# Patient Record
Sex: FEMALE | Race: WHITE
[De-identification: names, ages, dates, MRNs, and addresses within clinical notes are randomized per-mention and may not be internally consistent; named-entity substitution may affect disease eponyms.]

---

## 2018-10-13 ENCOUNTER — HOSPITAL ENCOUNTER (EMERGENCY)
Age: 33
Discharge: HOME | End: 2018-10-13

## 2019-01-07 ENCOUNTER — HOSPITAL ENCOUNTER (OUTPATIENT)
Dept: HOSPITAL 91 - OBT | Age: 34
Discharge: HOME | End: 2019-01-07
Payer: COMMERCIAL

## 2019-01-07 ENCOUNTER — HOSPITAL ENCOUNTER (OUTPATIENT)
Dept: HOSPITAL 10 - OBT | Age: 34
Discharge: HOME | End: 2019-01-07
Attending: OBSTETRICS & GYNECOLOGY
Payer: COMMERCIAL

## 2019-01-07 VITALS
WEIGHT: 182.32 LBS | HEIGHT: 62 IN | HEIGHT: 62 IN | BODY MASS INDEX: 33.55 KG/M2 | WEIGHT: 182.32 LBS | BODY MASS INDEX: 33.55 KG/M2

## 2019-01-07 VITALS — DIASTOLIC BLOOD PRESSURE: 69 MMHG | SYSTOLIC BLOOD PRESSURE: 114 MMHG | HEART RATE: 79 BPM | RESPIRATION RATE: 17 BRPM

## 2019-01-07 DIAGNOSIS — Z3A.20: ICD-10-CM

## 2019-01-07 DIAGNOSIS — R10.2: ICD-10-CM

## 2019-01-07 DIAGNOSIS — O26.892: Primary | ICD-10-CM

## 2019-01-07 LAB
ADD MAN DIFF?: NO
ADD UMIC: YES
BASOPHIL #: 0 10^3/UL (ref 0–0.1)
BASOPHILS %: 0.4 % (ref 0–2)
EOSINOPHILS #: 0.4 10^3/UL (ref 0–0.5)
EOSINOPHILS %: 3.7 % (ref 0–7)
HEMATOCRIT: 34.7 % (ref 37–47)
HEMOGLOBIN: 11.9 G/DL (ref 12–16)
IMMATURE GRANS #M: 0.04 10^3/UL (ref 0–0.03)
IMMATURE GRANS % (M): 0.4 % (ref 0–0.43)
LYMPHOCYTES #: 1.8 10^3/UL (ref 0.8–2.9)
LYMPHOCYTES %: 19 % (ref 15–51)
MEAN CORPUSCULAR HEMOGLOBIN: 32 PG (ref 29–33)
MEAN CORPUSCULAR HGB CONC: 34.3 G/DL (ref 32–37)
MEAN CORPUSCULAR VOLUME: 93.3 FL (ref 82–101)
MEAN PLATELET VOLUME: 11.3 FL (ref 7.4–10.4)
MONOCYTE #: 0.6 10^3/UL (ref 0.3–0.9)
MONOCYTES %: 6.6 % (ref 0–11)
NEUTROPHIL #: 6.6 10^3/UL (ref 1.6–7.5)
NEUTROPHILS %: 69.9 % (ref 39–77)
NUCLEATED RED BLOOD CELLS #: 0 10^3/UL (ref 0–0)
NUCLEATED RED BLOOD CELLS%: 0 /100WBC (ref 0–0)
PLATELET COUNT: 255 10^3/UL (ref 140–415)
RED BLOOD COUNT: 3.72 10^6/UL (ref 4.2–5.4)
RED CELL DISTRIBUTION WIDTH: 12.4 % (ref 11.5–14.5)
UR ASCORBIC ACID: 40 MG/DL
UR BACTERIA: (no result) /HPF
UR BILIRUBIN (DIP): NEGATIVE MG/DL
UR BLOOD (DIP): NEGATIVE MG/DL
UR CLARITY: (no result)
UR COLOR: YELLOW
UR GLUCOSE (DIP): NEGATIVE MG/DL
UR KETONES (DIP): (no result) MG/DL
UR LEUKOCYTE ESTERASE (DIP): (no result) LEU/UL
UR MUCUS: (no result) /HPF
UR NITRITE (DIP): NEGATIVE MG/DL
UR PH (DIP): 5 (ref 5–9)
UR RBC: 0 /HPF (ref 0–5)
UR SPECIFIC GRAVITY (DIP): 1.03 (ref 1–1.03)
UR SQUAMOUS EPITHELIAL CELL: (no result) /HPF
UR TOTAL PROTEIN (DIP): NEGATIVE MG/DL
UR UROBILINOGEN (DIP): NEGATIVE MG/DL
UR WBC: 4 /HPF (ref 0–5)
WHITE BLOOD COUNT: 9.4 10^3/UL (ref 4.8–10.8)

## 2019-01-07 PROCEDURE — G0463 HOSPITAL OUTPT CLINIC VISIT: HCPCS

## 2019-01-07 PROCEDURE — 76817 TRANSVAGINAL US OBSTETRIC: CPT

## 2019-01-07 PROCEDURE — 85025 COMPLETE CBC W/AUTO DIFF WBC: CPT

## 2019-01-07 PROCEDURE — 87086 URINE CULTURE/COLONY COUNT: CPT

## 2019-01-07 PROCEDURE — 81001 URINALYSIS AUTO W/SCOPE: CPT

## 2019-01-07 NOTE — TRIAGE
===================================

OB Triage

===================================

Datetime Report Generated by CPN: 01/07/2019 23:08

   

   

===========================

Datetime: 01/07/2019 20:30

===========================

   

 Stage of Pregnancy:  OB Triage

   

===========================

Datetime: 01/07/2019 19:36

===========================

   

 Stage of Pregnancy:  OB Triage

 Monitor Mode:  External

 Duration (sec)2399:  10sec

 Quality:  Mild

 Pattern:  Normal: <= 5 Contractions in 10 Minutes

 Resting Tone [place]:  Relaxed

   

===================================

Pain Assessment

===================================

   

 Pain Scale:  4

 Pain Presence:  Constant

 Pain Type:  Stabbing

 Pain Location:  Abdomen

   

===========================

Datetime: 01/07/2019 18:52

===========================

   

   

===================================

Labor Evaluation

===================================

   

 Frequency:  x3

 Monitor Mode:  External

 Duration (sec)2399:  30

 Quality:  Mild

 Pattern:  Normal: <= 5 Contractions in 10 Minutes

 Resting Tone [place]:  Relaxed

   

===========================

Datetime: 01/07/2019 17:52

===========================

   

   

===================================

Fetal Heart Rate

===================================

   

 Comments:  fhr 150 by us

   

===========================

Datetime: 01/07/2019 17:50

===========================

   

 Assessment Type:  Triage

   

===================================

Maternal Assessment

===================================

   

 Level of Consciousness:  Fully Conscious

 DTR's/Clonus:  DTRs 2+; No Clonus

 Headache:  Denies

 Blurred Vision:  No

 Respiratory Effort:  Unlabored; Regular Rhythm; Equal Expansion

 Breath Sounds, Left:  Clear and Equal

 Breath Sounds, Right:  Clear and Equal

 Nausea/Vomiting:  Denies

 RUQ Epigastric Pain:  Denies

 Lower Extremities Edema:  None

     Degree:  None

 Upper Extremities Edema:  None

     Degree:  None

 Facial Edema:  None

   

===================================

Fall Risk Assessment

===================================

   

 History of Falling:  (0) No

 Secondary Diagnosis:  (0) No

 Ambulatory Aid:  (0) Bedrest/Nurse Assist

 IV Therapy:  (0) No

 Gait:  (0) Normal/Bedrest/Immobile

 Mental Status:  (0) Oriented to Own Ability

 Fall Score:  0

 Fall Risk Score Definition:  No Risk: No action required

   

===========================

Datetime: 01/07/2019 17:49

===========================

   

 Time of Arrival:  01/07/2019 17:32

 EGA:  20.6

 Arrived By:  Ambulatory

 Arrived From:  Home

 Chief Complaint:  To ob triage c/o lower abdominal constant pain since 1100 am

 Fetal Movement:  Present

 Contractions:  Denies/Absent

 Rupture of Membranes:  Denies

 Vaginal Bleeding:  None

 Vaginal Discharge:  Denies

 Recent Sexual Intercouse:  Denies

 Abdominal Trauma:  Not Applicable

 Patient Complaints:  Other

 Time Provider Notified:  01/07/2019 18:00

 Provider Notified:  Dr Benson

 Initial Plan:  r/o ptl

## 2019-01-07 NOTE — PN
Triage Information


Date/Time


2019


Reason for visit:  Abd/pelvic pain


Weeks of Gestation


20w 6d


/Para


8/3


Diabetes:  none


Hypertention:  none


Additional information


Pt states she has had this pain before she delivered previously, but stronger. 


She says the pain started at 1100 today and went back and forth between 3/10 and


5/10 pain but was usually mild. Pt reports she has one spot of blood every 2 


days for weeks. She reports one this AM. Her last BM was last night. She last 


had intercourse 2 days ago. She states she was on various buses travelling all 


day today from 8AM to 5PM. She says she has a DCFS case pending and went to a 


parenting class today and had another one tonight but missed it in order to come


in. She says the pain is equal right = left in the lower abdomen. 


PMHx: none.


PSHx:  x 3.


NKDA.





Objective





Vital Signs


  Date      Temp  Pulse  Resp  B/P (MAP)   Pulse Ox  O2          O2 Flow    FiO2


Time                                                 Delivery    Rate


    19  98.4     79    17      114/69


     17:58                           (84)





Fetal Heart Rate:  150's


Contractions:  None


Exam


Pt appears very comfortable, is smiling while she talks. 


Abdomen completely soft and NT and there is no rebound. Uterus is soft to 


palpation. 


Deferred pelvic exam.





Results/Medications


Result Diagram:  


19 1836





Results 24 hrs





Laboratory Tests


     Test
                                      19
18:00  19
18:36


     Urine Color                          YELLOW


     Urine Clarity
                       SLIGHTLY
CLOUDY  A  



     Urine pH                                          5.0


     Urine Specific Gravity                          1.027


     Urine Ketones                                     2+  H


     Urine Nitrite                        NEGATIVE


     Urine Bilirubin                      NEGATIVE


     Urine Urobilinogen                   NEGATIVE


     Urine Leukocyte Esterase             TRACE  A


     Urine Microscopic RBC                               0


     Urine Microscopic WBC                               4


     Urine Squamous Epithelial
Cells      FEW  
              



     Urine Bacteria                       FEW  A


     Urine Mucus                          MANY  A


     Urine Hemoglobin                     NEGATIVE


     Urine Glucose                        NEGATIVE


     Urine Total Protein                  NEGATIVE


     White Blood Count                                               9.4


     Red Blood Count                                               3.72  L


     Hemoglobin                                                    11.9  L


     Hematocrit                                                    34.7  L


     Mean Corpuscular Volume                                        93.3


     Mean Corpuscular Hemoglobin                                    32.0


     Mean Corpuscular Hemoglobin
Concent  
                        34.3  



     Red Cell Distribution Width                                    12.4


     Platelet Count                                                  255


     Mean Platelet Volume                                          11.3  H


     Immature Granulocytes %                                       0.400


     Neutrophils %                                                  69.9


     Lymphocytes %                                                  19.0


     Monocytes %                                                     6.6


     Eosinophils %                                                   3.7


     Basophils %                                                     0.4


     Nucleated Red Blood Cells %                                     0.0


     Immature Granulocytes #                                      0.040  H


     Neutrophils #                                                   6.6


     Lymphocytes #                                                   1.8


     Monocytes #                                                     0.6


     Eosinophils #                                                   0.4


     Basophils #                                                     0.0


     Nucleated Red Blood Cells #                                     0.0





Imaging Results


US: CX long and closed and 4.7 cm long. Anterior placenta. No previa.


Disposition:  Discharge


Assessment/Plan


A: IUP at 20w 6d.


Abdominal pain of unclear etiology.


P: Pt feels very comfortable going home and is asking for something to eat prior


to going home and appears very comfortable.


Reviewed with pt that labs and US appear normal and there is no evidence of 


infection, or  labor.


May return if the pain increases and becomes constantly more severe.











MARCO PURI MD              2019 21:25

## 2019-04-08 ENCOUNTER — HOSPITAL ENCOUNTER (INPATIENT)
Dept: HOSPITAL 10 - OBT | Age: 34
LOS: 7 days | Discharge: HOME | End: 2019-04-15
Attending: OBSTETRICS & GYNECOLOGY | Admitting: OBSTETRICS & GYNECOLOGY
Payer: COMMERCIAL

## 2019-04-08 ENCOUNTER — HOSPITAL ENCOUNTER (INPATIENT)
Dept: HOSPITAL 91 - OBT | Age: 34
LOS: 7 days | Discharge: HOME | End: 2019-04-15
Payer: COMMERCIAL

## 2019-04-08 VITALS
WEIGHT: 182.76 LBS | HEIGHT: 60 IN | BODY MASS INDEX: 35.88 KG/M2 | BODY MASS INDEX: 35.88 KG/M2 | HEIGHT: 60 IN | WEIGHT: 182.76 LBS

## 2019-04-08 VITALS — DIASTOLIC BLOOD PRESSURE: 92 MMHG | RESPIRATION RATE: 17 BRPM | HEART RATE: 72 BPM | SYSTOLIC BLOOD PRESSURE: 143 MMHG

## 2019-04-08 DIAGNOSIS — O99.89: ICD-10-CM

## 2019-04-08 DIAGNOSIS — Z3A.35: ICD-10-CM

## 2019-04-08 DIAGNOSIS — O34.211: Primary | ICD-10-CM

## 2019-04-08 DIAGNOSIS — N73.6: ICD-10-CM

## 2019-04-08 LAB
ADD MAN DIFF?: NO
ADD UMIC: NO
ALANINE AMINOTRANSFERASE: 52 IU/L (ref 13–69)
ALBUMIN/GLOBULIN RATIO: 0.97
ALBUMIN: 3.8 G/DL (ref 3.3–4.9)
ALKALINE PHOSPHATASE: 208 IU/L (ref 42–121)
ANION GAP: 11 (ref 5–13)
ASPARTATE AMINO TRANSFERASE: 80 IU/L (ref 15–46)
BASOPHIL #: 0 10^3/UL (ref 0–0.1)
BASOPHILS %: 0.5 % (ref 0–2)
BILIRUBIN,DIRECT: 0 MG/DL (ref 0–0.2)
BILIRUBIN,TOTAL: 0.5 MG/DL (ref 0.2–1.3)
BLOOD UREA NITROGEN: 12 MG/DL (ref 7–20)
CALCIUM: 10.6 MG/DL (ref 8.4–10.2)
CARBON DIOXIDE: 19 MMOL/L (ref 21–31)
CHLORIDE: 107 MMOL/L (ref 97–110)
CREATININE: 0.59 MG/DL (ref 0.44–1)
EOSINOPHILS #: 0.1 10^3/UL (ref 0–0.5)
EOSINOPHILS %: 1.2 % (ref 0–7)
GLOBULIN: 3.9 G/DL (ref 1.3–3.2)
GLUCOSE: 93 MG/DL (ref 70–220)
HEMATOCRIT: 32.9 % (ref 37–47)
HEMOGLOBIN: 11.4 G/DL (ref 12–16)
IMMATURE GRANS #M: 0.03 10^3/UL (ref 0–0.03)
IMMATURE GRANS % (M): 0.5 % (ref 0–0.43)
LYMPHOCYTES #: 1.5 10^3/UL (ref 0.8–2.9)
LYMPHOCYTES %: 22.1 % (ref 15–51)
MEAN CORPUSCULAR HEMOGLOBIN: 32.4 PG (ref 29–33)
MEAN CORPUSCULAR HGB CONC: 34.7 G/DL (ref 32–37)
MEAN CORPUSCULAR VOLUME: 93.5 FL (ref 82–101)
MEAN PLATELET VOLUME: 11.7 FL (ref 7.4–10.4)
MONOCYTE #: 0.6 10^3/UL (ref 0.3–0.9)
MONOCYTES %: 8.9 % (ref 0–11)
NEUTROPHIL #: 4.4 10^3/UL (ref 1.6–7.5)
NEUTROPHILS %: 66.8 % (ref 39–77)
NUCLEATED RED BLOOD CELLS #: 0 10^3/UL (ref 0–0)
NUCLEATED RED BLOOD CELLS%: 0 /100WBC (ref 0–0)
PLATELET COUNT: 271 10^3/UL (ref 140–415)
POTASSIUM: 3.9 MMOL/L (ref 3.5–5.1)
RED BLOOD COUNT: 3.52 10^6/UL (ref 4.2–5.4)
RED CELL DISTRIBUTION WIDTH: 12.7 % (ref 11.5–14.5)
SODIUM: 137 MMOL/L (ref 135–144)
TOTAL PROTEIN: 7.7 G/DL (ref 6.1–8.1)
UR ASCORBIC ACID: NEGATIVE MG/DL
UR BILIRUBIN (DIP): NEGATIVE MG/DL
UR BLOOD (DIP): NEGATIVE MG/DL
UR CALCIUM OXALATE CRYSTAL: (no result) /HPF
UR CLARITY: (no result)
UR COLOR: YELLOW
UR GLUCOSE (DIP): NEGATIVE MG/DL
UR KETONES (DIP): NEGATIVE MG/DL
UR LEUKOCYTE ESTERASE (DIP): NEGATIVE LEU/UL
UR MUCUS: (no result) /HPF
UR NITRITE (DIP): NEGATIVE MG/DL
UR PH (DIP): 5 (ref 5–9)
UR RBC: 1 /HPF (ref 0–5)
UR SPECIFIC GRAVITY (DIP): 1.02 (ref 1–1.03)
UR SQUAMOUS EPITHELIAL CELL: (no result) /HPF
UR TOTAL PROTEIN (DIP): NEGATIVE MG/DL
UR UROBILINOGEN (DIP): NEGATIVE MG/DL
UR WBC: 3 /HPF (ref 0–5)
URIC ACID: 5.4 MG/DL (ref 3.1–7.9)
WHITE BLOOD COUNT: 6.6 10^3/UL (ref 4.8–10.8)

## 2019-04-08 PROCEDURE — 86901 BLOOD TYPING SEROLOGIC RH(D): CPT

## 2019-04-08 PROCEDURE — 85610 PROTHROMBIN TIME: CPT

## 2019-04-08 PROCEDURE — 86900 BLOOD TYPING SEROLOGIC ABO: CPT

## 2019-04-08 PROCEDURE — 82575 CREATININE CLEARANCE TEST: CPT

## 2019-04-08 PROCEDURE — 86850 RBC ANTIBODY SCREEN: CPT

## 2019-04-08 PROCEDURE — 86592 SYPHILIS TEST NON-TREP QUAL: CPT

## 2019-04-08 PROCEDURE — 81003 URINALYSIS AUTO W/O SCOPE: CPT

## 2019-04-08 PROCEDURE — 76818 FETAL BIOPHYS PROFILE W/NST: CPT

## 2019-04-08 PROCEDURE — 83735 ASSAY OF MAGNESIUM: CPT

## 2019-04-08 PROCEDURE — 87340 HEPATITIS B SURFACE AG IA: CPT

## 2019-04-08 PROCEDURE — 84156 ASSAY OF PROTEIN URINE: CPT

## 2019-04-08 PROCEDURE — 85730 THROMBOPLASTIN TIME PARTIAL: CPT

## 2019-04-08 PROCEDURE — 85025 COMPLETE CBC W/AUTO DIFF WBC: CPT

## 2019-04-08 PROCEDURE — 80053 COMPREHEN METABOLIC PANEL: CPT

## 2019-04-08 PROCEDURE — 84560 ASSAY OF URINE/URIC ACID: CPT

## 2019-04-08 PROCEDURE — 81001 URINALYSIS AUTO W/SCOPE: CPT

## 2019-04-08 PROCEDURE — G0463 HOSPITAL OUTPT CLINIC VISIT: HCPCS

## 2019-04-08 PROCEDURE — 76815 OB US LIMITED FETUS(S): CPT

## 2019-04-08 PROCEDURE — 88307 TISSUE EXAM BY PATHOLOGIST: CPT

## 2019-04-08 RX ADMIN — PYRIDOXINE HYDROCHLORIDE 1 MLS/HR: 100 INJECTION, SOLUTION INTRAMUSCULAR; INTRAVENOUS at 21:38

## 2019-04-08 RX ADMIN — BETAMETHASONE SODIUM PHOSPHATE AND BETAMETHASONE ACETATE 1 MG: 3; 3 INJECTION, SUSPENSION INTRA-ARTICULAR; INTRALESIONAL; INTRAMUSCULAR at 21:41

## 2019-04-08 RX ADMIN — MAGNESIUM SULFATE HEPTAHYDRATE 1 MLS/HR: 40 INJECTION, SOLUTION INTRAVENOUS at 21:42

## 2019-04-08 RX ADMIN — PYRIDOXINE HYDROCHLORIDE SCH MLS/HR: 100 INJECTION, SOLUTION INTRAMUSCULAR; INTRAVENOUS at 21:38

## 2019-04-08 RX ADMIN — MAGNESIUM SULFATE IN WATER SCH MLS/HR: 40 INJECTION, SOLUTION INTRAVENOUS at 22:15

## 2019-04-08 RX ADMIN — BETAMETHASONE SODIUM PHOSPHATE AND BETAMETHASONE ACETATE SCH MG: 3; 3 INJECTION, SUSPENSION INTRA-ARTICULAR; INTRALESIONAL; INTRAMUSCULAR at 21:41

## 2019-04-08 RX ADMIN — MAGNESIUM SULFATE IN WATER 1 MLS/HR: 40 INJECTION, SOLUTION INTRAVENOUS at 22:15

## 2019-04-08 NOTE — HP
Date/Time of Note


Date/Time of Note


DATE: 19 


TIME: 19:00





OB - History


Hx of Present Pregnancy


Chief Complaint:  Elevated BP in clinic


Estimated Due Date:  May 15, 2019


:  10


Para:  3


Spontaneous :  3


Therapeutic :  3


Prenatal Care:  Good Care


Ultrasounds:  Normal mid trimester US


Obstetrical Complications:  None


Medical Complications:  None





Past Family/Social History


*


Past Medical, Surgical, Family and Obstetric Histories reviewed from prenatal 


chart.





OB  Admission Exam


Physical Exam


HEENT:  WNL


Heart:  Rhythm Normal


Lungs:  Clear, Equal


Abdomen:  WNL


Extremities:  Normal


Reflexes:  Normal


Fetal Heart Rate:  120's


Accelerations:  Accelerations Present


Decelerations:  No Decelerations


Varibility:  Moderate


Contractions on Admission:  < 5 Minutes Apart





OB  Assessment/Plan


Reason for admission:  other


Other Assessment:


R/ O preeclampsia


R/O  labor


Other plan:


Admit


PIH labs


24 hour urine collection


IM betamethasone


IV magnesium sulfate











CORA ORO MD             2019 19:03

## 2019-04-09 LAB
24HR URINE TOTAL PROTEIN: > 600 MG/24HRS (ref 42–225)
COLLECTION PERIOD: 24 HRS
COLLECTION PERIOD: 24 HRS
CREATININE CLEARANCE: 118.2 MLS/MIN (ref 84–162)
CREATININE,URINE RANDOM: 47.81 MG/DL (ref 20–320)
MAGNESIUM: 4.8 MG/DL (ref 1.7–2.5)
MAGNESIUM: 5.2 MG/DL (ref 1.7–2.5)
MAGNESIUM: 5.8 MG/DL (ref 1.7–2.5)
MAGNESIUM: 6.4 MG/DL (ref 1.7–2.5)
SCRET: 0.59 MG/DL (ref 0.44–1)
URINE TOTAL PROTEIN: 37 MG/DL
VOLUME: 2100 ML/24HRS
VOLUME: 2100 MLS

## 2019-04-09 RX ADMIN — MAGNESIUM SULFATE IN WATER 1 MLS/HR: 40 INJECTION, SOLUTION INTRAVENOUS at 18:28

## 2019-04-09 RX ADMIN — Medication 1 MG: at 21:57

## 2019-04-09 RX ADMIN — Medication 1 TAB: at 08:57

## 2019-04-09 RX ADMIN — PYRIDOXINE HYDROCHLORIDE 1 MLS/HR: 100 INJECTION, SOLUTION INTRAMUSCULAR; INTRAVENOUS at 03:06

## 2019-04-09 RX ADMIN — MAGNESIUM SULFATE IN WATER 1 MLS/HR: 40 INJECTION, SOLUTION INTRAVENOUS at 06:27

## 2019-04-09 RX ADMIN — Medication 1 MG: at 08:57

## 2019-04-09 RX ADMIN — PYRIDOXINE HYDROCHLORIDE SCH MLS/HR: 100 INJECTION, SOLUTION INTRAMUSCULAR; INTRAVENOUS at 08:02

## 2019-04-09 RX ADMIN — BETAMETHASONE SODIUM PHOSPHATE AND BETAMETHASONE ACETATE 1 MG: 3; 3 INJECTION, SUSPENSION INTRA-ARTICULAR; INTRALESIONAL; INTRAMUSCULAR at 21:57

## 2019-04-09 RX ADMIN — Medication SCH TAB: at 08:57

## 2019-04-09 RX ADMIN — PYRIDOXINE HYDROCHLORIDE SCH MLS/HR: 100 INJECTION, SOLUTION INTRAMUSCULAR; INTRAVENOUS at 21:13

## 2019-04-09 RX ADMIN — MAGNESIUM SULFATE IN WATER SCH MLS/HR: 40 INJECTION, SOLUTION INTRAVENOUS at 06:27

## 2019-04-09 RX ADMIN — BETAMETHASONE SODIUM PHOSPHATE AND BETAMETHASONE ACETATE SCH MG: 3; 3 INJECTION, SUSPENSION INTRA-ARTICULAR; INTRALESIONAL; INTRAMUSCULAR at 21:57

## 2019-04-09 RX ADMIN — PYRIDOXINE HYDROCHLORIDE 1 MLS/HR: 100 INJECTION, SOLUTION INTRAMUSCULAR; INTRAVENOUS at 08:02

## 2019-04-09 RX ADMIN — MAGNESIUM SULFATE IN WATER SCH MLS/HR: 40 INJECTION, SOLUTION INTRAVENOUS at 18:28

## 2019-04-09 RX ADMIN — PYRIDOXINE HYDROCHLORIDE 1 MLS/HR: 100 INJECTION, SOLUTION INTRAMUSCULAR; INTRAVENOUS at 21:13

## 2019-04-09 RX ADMIN — PYRIDOXINE HYDROCHLORIDE SCH MLS/HR: 100 INJECTION, SOLUTION INTRAMUSCULAR; INTRAVENOUS at 03:06

## 2019-04-09 NOTE — QN
Documentation


Comment


No complaint


Afebrile VS


Strip Reactive


Continue with present care


Await result of 24 hour urine collection


Repeat labs in AM.











CORA ORO MD             Apr 9, 2019 22:31

## 2019-04-10 VITALS — SYSTOLIC BLOOD PRESSURE: 94 MMHG | RESPIRATION RATE: 18 BRPM | DIASTOLIC BLOOD PRESSURE: 51 MMHG | HEART RATE: 62 BPM

## 2019-04-10 LAB
ADD MAN DIFF?: NO
ALANINE AMINOTRANSFERASE: 99 IU/L (ref 13–69)
ALBUMIN/GLOBULIN RATIO: 1.02
ALBUMIN: 3.5 G/DL (ref 3.3–4.9)
ALKALINE PHOSPHATASE: 192 IU/L (ref 42–121)
ANION GAP: 11 (ref 5–13)
ASPARTATE AMINO TRANSFERASE: 170 IU/L (ref 15–46)
BASOPHIL #: 0 10^3/UL (ref 0–0.1)
BASOPHILS %: 0.2 % (ref 0–2)
BILIRUBIN,DIRECT: 0 MG/DL (ref 0–0.2)
BILIRUBIN,TOTAL: 0.2 MG/DL (ref 0.2–1.3)
BLOOD UREA NITROGEN: 6 MG/DL (ref 7–20)
CALCIUM: 7.7 MG/DL (ref 8.4–10.2)
CARBON DIOXIDE: 21 MMOL/L (ref 21–31)
CHLORIDE: 106 MMOL/L (ref 97–110)
CREATININE: 0.55 MG/DL (ref 0.44–1)
EOSINOPHILS #: 0 10^3/UL (ref 0–0.5)
EOSINOPHILS %: 0 % (ref 0–7)
GLOBULIN: 3.4 G/DL (ref 1.3–3.2)
GLUCOSE: 139 MG/DL (ref 70–220)
HEMATOCRIT: 28.4 % (ref 37–47)
HEMOGLOBIN: 9.8 G/DL (ref 12–16)
HEPATITIS B SURFACE ANTIGEN: NEGATIVE
IMMATURE GRANS #M: 0.08 10^3/UL (ref 0–0.03)
IMMATURE GRANS % (M): 1.4 % (ref 0–0.43)
INR: 0.85
LYMPHOCYTES #: 0.7 10^3/UL (ref 0.8–2.9)
LYMPHOCYTES %: 12.7 % (ref 15–51)
MEAN CORPUSCULAR HEMOGLOBIN: 32.7 PG (ref 29–33)
MEAN CORPUSCULAR HGB CONC: 34.5 G/DL (ref 32–37)
MEAN CORPUSCULAR VOLUME: 94.7 FL (ref 82–101)
MEAN PLATELET VOLUME: 11.5 FL (ref 7.4–10.4)
MONOCYTE #: 0.3 10^3/UL (ref 0.3–0.9)
MONOCYTES %: 5.3 % (ref 0–11)
NEUTROPHIL #: 4.7 10^3/UL (ref 1.6–7.5)
NEUTROPHILS %: 80.4 % (ref 39–77)
NUCLEATED RED BLOOD CELLS #: 0 10^3/UL (ref 0–0)
NUCLEATED RED BLOOD CELLS%: 0 /100WBC (ref 0–0)
PARTIAL THROMBOPLASTIN TIME: 23.1 SEC (ref 23–35)
PLATELET COUNT: 230 10^3/UL (ref 140–415)
POTASSIUM: 4 MMOL/L (ref 3.5–5.1)
PROTIME: 11.7 SEC (ref 11.9–14.9)
PT RATIO: 0.9
RAPID PLASMA REAGIN: NONREACTIVE
RED BLOOD COUNT: 3 10^6/UL (ref 4.2–5.4)
RED CELL DISTRIBUTION WIDTH: 12.9 % (ref 11.5–14.5)
SODIUM: 138 MMOL/L (ref 135–144)
TOTAL PROTEIN: 6.9 G/DL (ref 6.1–8.1)
URIC ACID: 5.1 MG/DL (ref 3.1–7.9)
WHITE BLOOD COUNT: 5.8 10^3/UL (ref 4.8–10.8)

## 2019-04-10 RX ADMIN — PYRIDOXINE HYDROCHLORIDE 1 MLS/HR: 100 INJECTION, SOLUTION INTRAMUSCULAR; INTRAVENOUS at 20:34

## 2019-04-10 RX ADMIN — PYRIDOXINE HYDROCHLORIDE 1 MLS/HR: 100 INJECTION, SOLUTION INTRAMUSCULAR; INTRAVENOUS at 03:06

## 2019-04-10 RX ADMIN — PYRIDOXINE HYDROCHLORIDE SCH MLS/HR: 100 INJECTION, SOLUTION INTRAMUSCULAR; INTRAVENOUS at 20:06

## 2019-04-10 RX ADMIN — PYRIDOXINE HYDROCHLORIDE 1 MLS/HR: 100 INJECTION, SOLUTION INTRAMUSCULAR; INTRAVENOUS at 14:10

## 2019-04-10 RX ADMIN — Medication SCH TAB: at 08:22

## 2019-04-10 RX ADMIN — Medication 1 MLS/HR: at 23:32

## 2019-04-10 RX ADMIN — PYRIDOXINE HYDROCHLORIDE SCH MLS/HR: 100 INJECTION, SOLUTION INTRAMUSCULAR; INTRAVENOUS at 14:10

## 2019-04-10 RX ADMIN — DIPHENHYDRAMINE HYDROCHLORIDE 1 MG: 50 INJECTION, SOLUTION INTRAMUSCULAR; INTRAVENOUS at 19:37

## 2019-04-10 RX ADMIN — Medication 1 MLS/HR: at 19:36

## 2019-04-10 RX ADMIN — DIPHENHYDRAMINE HYDROCHLORIDE PRN MG: 50 INJECTION, SOLUTION INTRAMUSCULAR; INTRAVENOUS at 19:37

## 2019-04-10 RX ADMIN — AZITHROMYCIN MONOHYDRATE 1 MLS/HR: 500 INJECTION, POWDER, LYOPHILIZED, FOR SOLUTION INTRAVENOUS at 17:07

## 2019-04-10 RX ADMIN — PYRIDOXINE HYDROCHLORIDE SCH MLS/HR: 100 INJECTION, SOLUTION INTRAMUSCULAR; INTRAVENOUS at 20:34

## 2019-04-10 RX ADMIN — KETOROLAC TROMETHAMINE PRN MG: 30 INJECTION, SOLUTION INTRAMUSCULAR at 20:31

## 2019-04-10 RX ADMIN — PYRIDOXINE HYDROCHLORIDE SCH MLS/HR: 100 INJECTION, SOLUTION INTRAMUSCULAR; INTRAVENOUS at 20:15

## 2019-04-10 RX ADMIN — Medication 1 TAB: at 08:22

## 2019-04-10 RX ADMIN — KETOROLAC TROMETHAMINE 1 MG: 30 INJECTION, SOLUTION INTRAMUSCULAR at 20:31

## 2019-04-10 RX ADMIN — PYRIDOXINE HYDROCHLORIDE 1 MLS/HR: 100 INJECTION, SOLUTION INTRAMUSCULAR; INTRAVENOUS at 20:06

## 2019-04-10 RX ADMIN — PYRIDOXINE HYDROCHLORIDE 1 MLS/HR: 100 INJECTION, SOLUTION INTRAMUSCULAR; INTRAVENOUS at 23:06

## 2019-04-10 RX ADMIN — Medication 1 MG: at 21:00

## 2019-04-10 RX ADMIN — PYRIDOXINE HYDROCHLORIDE 1 MLS/HR: 100 INJECTION, SOLUTION INTRAMUSCULAR; INTRAVENOUS at 20:15

## 2019-04-10 RX ADMIN — PYRIDOXINE HYDROCHLORIDE SCH MLS/HR: 100 INJECTION, SOLUTION INTRAMUSCULAR; INTRAVENOUS at 03:06

## 2019-04-10 RX ADMIN — HYDROMORPHONE HYDROCHLORIDE 1 MG: 1 INJECTION, SOLUTION INTRAMUSCULAR; INTRAVENOUS; SUBCUTANEOUS at 22:29

## 2019-04-10 RX ADMIN — Medication 1 MG: at 08:22

## 2019-04-10 RX ADMIN — PYRIDOXINE HYDROCHLORIDE SCH MLS/HR: 100 INJECTION, SOLUTION INTRAMUSCULAR; INTRAVENOUS at 16:26

## 2019-04-10 RX ADMIN — PYRIDOXINE HYDROCHLORIDE 1 MLS/HR: 100 INJECTION, SOLUTION INTRAMUSCULAR; INTRAVENOUS at 16:26

## 2019-04-10 NOTE — QN
Documentation


Comment


Patient's ALT and AST have been increasing.


Case discussed with Dr Moseley ([facility]) who recommends to deliver the patient at this


time.


Patient is for delivery by repeat .


Risks, benefits and alternatives were explained to the patient who ststed she 


understood and gave informed consent for the procedure.











CORA ORO MD            Apr 10, 2019 17:53

## 2019-04-10 NOTE — PAC
Date/Time of Note


Date/Time of Note


DATE: 4/10/19 


TIME: 19:18





Post-Anesthesia Notes


Post-Anesthesia Note


Last documented vital signs





Vital Signs


  Date      Temp  Pulse  Resp  B/P (MAP)   Pulse Ox  O2          O2 Flow    FiO2


Time                                                 Delivery    Rate


    4/8/19  98.4     72    17      143/92


      1918                          (109)





Activity:  WNL


Respiratory function:  WNL


Cardiovascular function:  WNL


Mental status:  Baseline


Pain reasonably controlled:  Yes


Hydration appropriate:  Yes


Nausea/Vomiting absent:  Yes











ESE SAINZ                 Apr 10, 2019 19:18

## 2019-04-10 NOTE — CONS
DATE OF ADMISSION: 04/08/2019

DATE OF CONSULTATION:  04/10/2019

 

 

 

HISTORY OF PRESENT ILLNESS:  The patient is currently at 35 weeks, was admitted on Monday with elevat
ed blood pressure.  At that time, her AST was elevated; however, the repeat today shows that currentl
y the AST is higher, and now currently ALT is also elevated twice normal.

 

Her blood pressures are within moderate to normal range, 24-hour urine for protein is 777 mg.

 

She received betamethasone.

 

RECOMMENDATIONS:  I do recommend delivery secondary to severe preeclampsia given the twice higher elmo
n normal liver function test and over 34 weeks.

 

 

Dictated By: NGOZI NOVAK MD

 

ST/NTS

DD:    04/10/2019 14:37:51

DT:    04/10/2019 18:49:52

Conf#: [id number]

DID#:  [id number]

CC: CORA ORO MD;*End*

## 2019-04-10 NOTE — PREAC
Date/Time of Note


Date/Time of Note


DATE: 4/10/19 


TIME: 15:46





Anesthesia Eval and Record


Evaluation


Time Pre-Procedure Interview


DATE: 4/10/19 


TIME: 15:46


Age


33


Sex


female


NPO:  8 hrs


Preoperative diagnosis


iup at 34 weeks with pih


Planned procedure


repeat c section





Past Medical History


Past Medical History:  Includes


GI:  Obesity


Pregnancy:  PIH





Surgery & Anesthesia Issues


No known issue





Meds


Anticoagulation:  No


Beta Blocker within 24 hr:  No


Reason Beta Blocker not given:  Pt. not on B-Blocker


Reported Medications


Ferrous Sulfate* (Ferrous Sulfate*) 140 Mg Tablet.er, 140 MG PO BID


   7/4/13


Prenatal Vit-Iron Fumarate-FA (Prenatal Vitamin Tablet) 1 Each Tablet, 1 EACH PO


   7/4/13





Current Medications


Lactated Ringer's 1,000 ml @  125 mls/hr Q8H IV  Last administered on 4/10/19at 


14:10; Admin Dose 125 MLS/HR;  Start 4/8/19 at 19:06


Ferrous Sulfate (Slow Fe) 142 mg BID PO  Last administered on 4/10/19at 08:22; 


Admin Dose 142 MG;  Start 4/9/19 at 09:00


Prenat Multivit/ [place]/Iron/Folic Ac (Prenatal) 1 tab DAILY PO  Last 


administered on 4/10/19at 08:22; Admin Dose 1 TAB;  Start 4/9/19 at 09:00


Lactated Ringer's 1,000 ml @  125 mls/hr Q8H IV ;  Start 4/10/19 at 12:06


Cefazolin Sodium/ Dextrose 50 ml @  100 mls/hr ONCE IVPB ;  Start 4/10/19 at 


12:30


Oxytocin/Lactated Ringer's 500 ml @  125 mls/hr POST PARTUM IV ;  Start 4/10/19 


at 12:30


Oxytocin/Lactated Ringer's 500 ml @ 0 mls/hr ONCE PRN IV .VAGINAL BLEEDING;  


Start 4/10/19 at 12:30


Methylergonovine Maleate (Methergine) 0.2 mg ONCE PRN IM .VAGINAL BLEEDING;  


Start 4/10/19 at 12:30


Carboprost Tromethamine (Hemabate) 250 mcg ONCE PRN IM .VAGINAL BLEEDING;  Start


4/10/19 at 12:30


Misoprostol (Cytotec) 1,000 mcg ONCE PRN LA .VAGINAL BLEEDING;  Start 4/10/19 at


12:30


Lactated Ringer's 1,000 ml @  125 mls/hr Q8H IV ;  Start 4/10/19 at 12:15


Ampicillin 100 ml @  100 mls/hr ONCE IV ;  Start 4/10/19 at 12:30


Azithromycin 250 ml @  250 mls/hr ONCE IV ;  Start 4/10/19 at 12:30


Oxytocin/Lactated Ringer's 500 ml @ 0 mls/hr ONCE PRN IV .VAGINAL BLEEDING;  


Start 4/10/19 at 12:30


Methylergonovine Maleate (Methergine) 0.2 mg ONCE PRN IM .VAGINAL BLEEDING;  


Start 4/10/19 at 12:30


Carboprost Tromethamine (Hemabate) 250 mcg ONCE PRN IM .VAGINAL BLEEDING;  Start


4/10/19 at 12:30


Misoprostol (Cytotec) 1,000 mcg ONCE PRN LA .VAGINAL BLEEDING;  Start 4/10/19 at


12:30


Meds reviewed:  Yes





Allergies


Coded Allergies:  


     No Known Drug Allergies (Verified  Allergy, Mild, 4/26/12)


Allergies Reviewed:  Yes





Labs/Studies


Labs Reviewed:  Reviewed by anesthesiologist


Result Diagram:  


4/10/19 0431                                                                    


           4/10/19 0431





Laboratory Tests


4/10/19 04:31











Blood Bank


                  Test
                         4/10/19
13:00


                  Antibody Screen               NEGATIVE


                  Blood Type                    B POSITIVE


                  Rh Immune Globulin Candidate  NO





Pregnancy test:  Positive





Pre-procedure Exam


Last vitals





Vital Signs


  Date      Temp  Pulse  Resp  B/P (MAP)   Pulse Ox  O2          O2 Flow    FiO2


Time                                                 Delivery    Rate


    4/8/19  98.4     72    17      143/92


     19:05                          (109)





Airway:  Adequate mouth opening, Adequate thyromental dist


Mallampati:  Mallampati II


Teeth:  Normal


Lung:  Normal


Heart:  Normal





ASA Physical Status


ASA physical status:  2


Emergency:  None





Planned Anesthetic


Neuraxial:  Spinal





Planned Pain Management


Sub-arachniod narcotics





Pre-operative Attestations


Prior to commencing anesthesia and surgery, the patient was re-evaluated, there 


was verification of:


*The patient's identity


*The results of appropriate recent lab work and preoperative vital signs


*The above evaluation not changing prior to induction


*Anesthetic plan, risk benefits, alternative and complications discussed with 


patient/family; questions answered; patient/family understands, accepts and 


wishes to proceed.











ESE SAINZ                 Apr 10, 2019 15:47

## 2019-04-10 NOTE — OPPN
Date/Time of Note


Date/Time of Note


DATE: 4/10/19 


TIME: 19:32





Operative Report


Planned Procedure


Procedure date


Apr 10, 2019


Procedure(s)


Repeat LTCS and lysis of adhesions


Performed by


see signature line


Assistant:  CORAZON EDUARDO


2nd Assistant


none


Anesthesiologist:  ESE SAINZ


Pre-procedure diagnosis


Pregnancy 35 weeks, previous c/s x3, preeclampsia with severe features


                 [id number]
Anesthesia Type:  [id number]
spinal








Post-Procedure


Post-procedure diagnosis


same, dense pelvic adhesions


Findings


Live Baby, Apgars 8 and 9.


Estimated Blood Loss:  other (600 ml)


Specimen(s)


Placenta


Grafts/Implant(s)


none


Complication(s)


none











CORA ORO MD            Apr 10, 2019 19:35

## 2019-04-11 VITALS — SYSTOLIC BLOOD PRESSURE: 133 MMHG | HEART RATE: 95 BPM | DIASTOLIC BLOOD PRESSURE: 60 MMHG | RESPIRATION RATE: 18 BRPM

## 2019-04-11 VITALS — RESPIRATION RATE: 18 BRPM | DIASTOLIC BLOOD PRESSURE: 69 MMHG | SYSTOLIC BLOOD PRESSURE: 138 MMHG | HEART RATE: 58 BPM

## 2019-04-11 VITALS — RESPIRATION RATE: 20 BRPM | SYSTOLIC BLOOD PRESSURE: 117 MMHG | DIASTOLIC BLOOD PRESSURE: 76 MMHG | HEART RATE: 73 BPM

## 2019-04-11 VITALS — DIASTOLIC BLOOD PRESSURE: 54 MMHG | RESPIRATION RATE: 18 BRPM | HEART RATE: 59 BPM | SYSTOLIC BLOOD PRESSURE: 105 MMHG

## 2019-04-11 VITALS — DIASTOLIC BLOOD PRESSURE: 63 MMHG | RESPIRATION RATE: 18 BRPM | SYSTOLIC BLOOD PRESSURE: 109 MMHG | HEART RATE: 77 BPM

## 2019-04-11 VITALS — HEART RATE: 98 BPM | DIASTOLIC BLOOD PRESSURE: 65 MMHG | SYSTOLIC BLOOD PRESSURE: 130 MMHG | RESPIRATION RATE: 16 BRPM

## 2019-04-11 LAB
ADD MAN DIFF?: NO
ALANINE AMINOTRANSFERASE: 125 IU/L (ref 13–69)
ALBUMIN/GLOBULIN RATIO: 1
ALBUMIN: 2.8 G/DL (ref 3.3–4.9)
ALKALINE PHOSPHATASE: 142 IU/L (ref 42–121)
ANION GAP: 6 (ref 5–13)
ASPARTATE AMINO TRANSFERASE: 157 IU/L (ref 15–46)
BASOPHIL #: 0 10^3/UL (ref 0–0.1)
BASOPHILS %: 0.2 % (ref 0–2)
BILIRUBIN,DIRECT: 0 MG/DL (ref 0–0.2)
BILIRUBIN,TOTAL: 0.3 MG/DL (ref 0.2–1.3)
BLOOD UREA NITROGEN: 11 MG/DL (ref 7–20)
CALCIUM: 8.4 MG/DL (ref 8.4–10.2)
CARBON DIOXIDE: 21 MMOL/L (ref 21–31)
CHLORIDE: 111 MMOL/L (ref 97–110)
CREATININE: 0.63 MG/DL (ref 0.44–1)
EOSINOPHILS #: 0 10^3/UL (ref 0–0.5)
EOSINOPHILS %: 0 % (ref 0–7)
GLOBULIN: 2.8 G/DL (ref 1.3–3.2)
GLUCOSE: 86 MG/DL (ref 70–220)
HEMATOCRIT: 26.6 % (ref 37–47)
HEMOGLOBIN: 8.9 G/DL (ref 12–16)
IMMATURE GRANS #M: 0.1 10^3/UL (ref 0–0.03)
IMMATURE GRANS % (M): 0.9 % (ref 0–0.43)
LYMPHOCYTES #: 1.3 10^3/UL (ref 0.8–2.9)
LYMPHOCYTES %: 12.5 % (ref 15–51)
MEAN CORPUSCULAR HEMOGLOBIN: 32.1 PG (ref 29–33)
MEAN CORPUSCULAR HGB CONC: 33.5 G/DL (ref 32–37)
MEAN CORPUSCULAR VOLUME: 96 FL (ref 82–101)
MEAN PLATELET VOLUME: 11.6 FL (ref 7.4–10.4)
MONOCYTE #: 1.2 10^3/UL (ref 0.3–0.9)
MONOCYTES %: 10.8 % (ref 0–11)
NEUTROPHIL #: 8.1 10^3/UL (ref 1.6–7.5)
NEUTROPHILS %: 75.6 % (ref 39–77)
NUCLEATED RED BLOOD CELLS #: 0 10^3/UL (ref 0–0)
NUCLEATED RED BLOOD CELLS%: 0.2 /100WBC (ref 0–0)
PLATELET COUNT: 197 10^3/UL (ref 140–415)
POTASSIUM: 4.2 MMOL/L (ref 3.5–5.1)
RED BLOOD COUNT: 2.77 10^6/UL (ref 4.2–5.4)
RED CELL DISTRIBUTION WIDTH: 12.8 % (ref 11.5–14.5)
SODIUM: 138 MMOL/L (ref 135–144)
TOTAL PROTEIN: 5.6 G/DL (ref 6.1–8.1)
URIC ACID: 4.5 MG/DL (ref 3.1–7.9)
WHITE BLOOD COUNT: 10.7 10^3/UL (ref 4.8–10.8)

## 2019-04-11 RX ADMIN — PYRIDOXINE HYDROCHLORIDE 1 MLS/HR: 100 INJECTION, SOLUTION INTRAMUSCULAR; INTRAVENOUS at 09:47

## 2019-04-11 RX ADMIN — KETOROLAC TROMETHAMINE 1 MG: 30 INJECTION, SOLUTION INTRAMUSCULAR at 18:00

## 2019-04-11 RX ADMIN — DOCUSATE SODIUM AND SENNOSIDES 1 TAB: 8.6; 5 TABLET, FILM COATED ORAL at 21:31

## 2019-04-11 RX ADMIN — DIPHENHYDRAMINE HYDROCHLORIDE PRN MG: 50 INJECTION, SOLUTION INTRAMUSCULAR; INTRAVENOUS at 02:14

## 2019-04-11 RX ADMIN — IBUPROFEN 1 MG: 800 TABLET, FILM COATED ORAL at 21:31

## 2019-04-11 RX ADMIN — FERROUS SULFATE TAB 325 MG (65 MG ELEMENTAL FE) 1 MG: 325 (65 FE) TAB at 21:31

## 2019-04-11 RX ADMIN — KETOROLAC TROMETHAMINE 1 MG: 30 INJECTION, SOLUTION INTRAMUSCULAR at 02:14

## 2019-04-11 RX ADMIN — FERROUS SULFATE TAB 325 MG (65 MG ELEMENTAL FE) SCH MG: 325 (65 FE) TAB at 21:31

## 2019-04-11 RX ADMIN — KETOROLAC TROMETHAMINE PRN MG: 30 INJECTION, SOLUTION INTRAMUSCULAR at 18:00

## 2019-04-11 RX ADMIN — KETOROLAC TROMETHAMINE PRN MG: 30 INJECTION, SOLUTION INTRAMUSCULAR at 02:14

## 2019-04-11 RX ADMIN — DIPHENHYDRAMINE HYDROCHLORIDE 1 MG: 50 INJECTION, SOLUTION INTRAMUSCULAR; INTRAVENOUS at 02:14

## 2019-04-11 RX ADMIN — IBUPROFEN SCH MG: 800 TABLET ORAL at 21:31

## 2019-04-11 RX ADMIN — KETOROLAC TROMETHAMINE 1 MG: 30 INJECTION, SOLUTION INTRAMUSCULAR at 12:37

## 2019-04-11 RX ADMIN — DOCUSATE SODIUM AND SENNOSIDES SCH TAB: 8.6; 5 TABLET, FILM COATED ORAL at 21:31

## 2019-04-11 RX ADMIN — KETOROLAC TROMETHAMINE PRN MG: 30 INJECTION, SOLUTION INTRAMUSCULAR at 12:37

## 2019-04-11 NOTE — QN
Documentation


Comment


No complaint


Afebrile VSS


Abdomen soft


ND


POD #1


Stable


Ambulate


Advance diet.











CORA ORO MD            Apr 11, 2019 18:25

## 2019-04-11 NOTE — OPR
DATE OF OPERATION:  04/10/2019

 

 

PREOPERATIVE DIAGNOSES:  Pregnancy at 35 weeks with previous  section x3, preeclampsia with s
evere features.

 

POSTOPERATIVE DIAGNOSES:  Pregnancy at 35 weeks with previous  section x3, preeclampsia with 
severe features and dense pelvic adhesions.

 

OPERATION:  Repeat low transverse  section and lysis of adhesions.

 

SURGEON:  Cora Ross MD

 

ASSISTANT:  Maurice Lloyd MD

 

ANESTHESIA:  Spinal.

 

ANESTHESIOLOGIST:  Dr. Deng

 

PROCEDURE:  The patient was taken to the operating room and placed on the operating table.  After suc
cessful spinal anesthesia was given, the patient was placed in supine position.  The area was prepare
d and draped in the usual sterile fashion.  Spinal anesthesia was tested and was satisfactory.  Using
 scalpel, Pfannenstiel incision was made about 2 fingerbreadths above the symphysis pubis.  The incis
ion was carried down to the fascia.  The fascia was incised and extended bilaterally with Winn scisso
rs.  Two Kochers were used to separate the fascia from the muscle.  The muscle was dissected down to 
peritoneum.  The peritoneum was secured with 2 Kellys and incised with Metzenbaum scissors.  Upon ent
ry into the peritoneal cavity, there were dense pelvic adhesions involving anterior aspect of the Eek
valeria.  Using Winn scissors, sharp lysis of adhesions was performed.  Using a scalpel, a small transver
se incision was made on the lower segment of the uterus.  Upon entering the uterine cavity, bandage s
cissors were inserted to extend the incision bilaterally, curved up.  Baby was delivered from cephali
c presentation.  After suctioning clear of amniotic fluid, baby was handed off to the  team i
n attendance.  Apgars were 8 and 9.  The placenta was delivered without difficulty.  The uterus was c
losed with #1 Monocryl continuous locked.  After assuring hemostasis, both ovaries and tubes were ins
pected, all looked normal.  The peritoneum was closed with 2-0 Vicryl continuous.  Fascia was closed 
with #1 Vicryl continuous in 2 segments.  Subcutaneous tissue was reapproximated with 2-0 plain.  The
 skin was closed with staples.

 

ESTIMATED BLOOD LOSS:  600 mL.

 

COUNTS:  All counts were correct.

 

 

Dictated By: CORA ESCALANTE/NTS

DD:    04/10/2019 19:17:50

DT:    2019 00:27:23

Conf#: [id number]

DID#:  [id number]

## 2019-04-12 VITALS — DIASTOLIC BLOOD PRESSURE: 85 MMHG | SYSTOLIC BLOOD PRESSURE: 129 MMHG | HEART RATE: 62 BPM | RESPIRATION RATE: 19 BRPM

## 2019-04-12 VITALS — RESPIRATION RATE: 16 BRPM | SYSTOLIC BLOOD PRESSURE: 118 MMHG | HEART RATE: 51 BPM | DIASTOLIC BLOOD PRESSURE: 62 MMHG

## 2019-04-12 VITALS — RESPIRATION RATE: 18 BRPM | DIASTOLIC BLOOD PRESSURE: 66 MMHG | HEART RATE: 62 BPM | SYSTOLIC BLOOD PRESSURE: 105 MMHG

## 2019-04-12 VITALS — HEART RATE: 79 BPM | SYSTOLIC BLOOD PRESSURE: 130 MMHG | DIASTOLIC BLOOD PRESSURE: 80 MMHG | RESPIRATION RATE: 18 BRPM

## 2019-04-12 LAB
ADD MAN DIFF?: NO
ALANINE AMINOTRANSFERASE: 104 IU/L (ref 13–69)
ALBUMIN/GLOBULIN RATIO: 0.96
ALBUMIN: 2.8 G/DL (ref 3.3–4.9)
ALKALINE PHOSPHATASE: 134 IU/L (ref 42–121)
ANION GAP: 6 (ref 5–13)
ASPARTATE AMINO TRANSFERASE: 91 IU/L (ref 15–46)
BASOPHIL #: 0 10^3/UL (ref 0–0.1)
BASOPHILS %: 0.5 % (ref 0–2)
BILIRUBIN,DIRECT: 0 MG/DL (ref 0–0.2)
BILIRUBIN,TOTAL: 0.3 MG/DL (ref 0.2–1.3)
BLOOD UREA NITROGEN: 15 MG/DL (ref 7–20)
CALCIUM: 9 MG/DL (ref 8.4–10.2)
CARBON DIOXIDE: 21 MMOL/L (ref 21–31)
CHLORIDE: 110 MMOL/L (ref 97–110)
CREATININE: 0.65 MG/DL (ref 0.44–1)
EOSINOPHILS #: 0.1 10^3/UL (ref 0–0.5)
EOSINOPHILS %: 0.8 % (ref 0–7)
GLOBULIN: 2.9 G/DL (ref 1.3–3.2)
GLUCOSE: 72 MG/DL (ref 70–220)
HEMATOCRIT: 26.9 % (ref 37–47)
HEMOGLOBIN: 8.9 G/DL (ref 12–16)
IMMATURE GRANS #M: 0.07 10^3/UL (ref 0–0.03)
IMMATURE GRANS % (M): 0.9 % (ref 0–0.43)
LYMPHOCYTES #: 1.7 10^3/UL (ref 0.8–2.9)
LYMPHOCYTES %: 21.3 % (ref 15–51)
MEAN CORPUSCULAR HEMOGLOBIN: 32 PG (ref 29–33)
MEAN CORPUSCULAR HGB CONC: 33.1 G/DL (ref 32–37)
MEAN CORPUSCULAR VOLUME: 96.8 FL (ref 82–101)
MEAN PLATELET VOLUME: 11.8 FL (ref 7.4–10.4)
MONOCYTE #: 0.9 10^3/UL (ref 0.3–0.9)
MONOCYTES %: 10.7 % (ref 0–11)
NEUTROPHIL #: 5.2 10^3/UL (ref 1.6–7.5)
NEUTROPHILS %: 65.8 % (ref 39–77)
NUCLEATED RED BLOOD CELLS #: 0 10^3/UL (ref 0–0)
NUCLEATED RED BLOOD CELLS%: 0.4 /100WBC (ref 0–0)
PLATELET COUNT: 189 10^3/UL (ref 140–415)
POTASSIUM: 4 MMOL/L (ref 3.5–5.1)
RED BLOOD COUNT: 2.78 10^6/UL (ref 4.2–5.4)
RED CELL DISTRIBUTION WIDTH: 12.8 % (ref 11.5–14.5)
SODIUM: 137 MMOL/L (ref 135–144)
TOTAL PROTEIN: 5.7 G/DL (ref 6.1–8.1)
URIC ACID: 5.3 MG/DL (ref 3.1–7.9)
WHITE BLOOD COUNT: 7.9 10^3/UL (ref 4.8–10.8)

## 2019-04-12 RX ADMIN — OXYCODONE HYDROCHLORIDE AND ACETAMINOPHEN 1 TAB: 5; 325 TABLET ORAL at 13:33

## 2019-04-12 RX ADMIN — OXYCODONE HYDROCHLORIDE AND ACETAMINOPHEN 1 TAB: 5; 325 TABLET ORAL at 17:57

## 2019-04-12 RX ADMIN — IBUPROFEN SCH MG: 800 TABLET ORAL at 06:07

## 2019-04-12 RX ADMIN — DOCUSATE SODIUM AND SENNOSIDES 1 TAB: 8.6; 5 TABLET, FILM COATED ORAL at 10:29

## 2019-04-12 RX ADMIN — IBUPROFEN SCH MG: 800 TABLET ORAL at 22:00

## 2019-04-12 RX ADMIN — IBUPROFEN 1 MG: 800 TABLET, FILM COATED ORAL at 22:00

## 2019-04-12 RX ADMIN — DOCUSATE SODIUM AND SENNOSIDES 1 TAB: 8.6; 5 TABLET, FILM COATED ORAL at 21:21

## 2019-04-12 RX ADMIN — FERROUS SULFATE TAB 325 MG (65 MG ELEMENTAL FE) 1 MG: 325 (65 FE) TAB at 21:21

## 2019-04-12 RX ADMIN — FERROUS SULFATE TAB 325 MG (65 MG ELEMENTAL FE) SCH MG: 325 (65 FE) TAB at 10:29

## 2019-04-12 RX ADMIN — FERROUS SULFATE TAB 325 MG (65 MG ELEMENTAL FE) SCH MG: 325 (65 FE) TAB at 21:21

## 2019-04-12 RX ADMIN — IBUPROFEN 1 MG: 800 TABLET, FILM COATED ORAL at 13:33

## 2019-04-12 RX ADMIN — FERROUS SULFATE TAB 325 MG (65 MG ELEMENTAL FE) SCH MG: 325 (65 FE) TAB at 12:49

## 2019-04-12 RX ADMIN — IBUPROFEN SCH MG: 800 TABLET ORAL at 13:33

## 2019-04-12 RX ADMIN — FERROUS SULFATE TAB 325 MG (65 MG ELEMENTAL FE) 1 MG: 325 (65 FE) TAB at 12:49

## 2019-04-12 RX ADMIN — IBUPROFEN 1 MG: 800 TABLET, FILM COATED ORAL at 06:07

## 2019-04-12 RX ADMIN — FERROUS SULFATE TAB 325 MG (65 MG ELEMENTAL FE) 1 MG: 325 (65 FE) TAB at 10:29

## 2019-04-12 RX ADMIN — DOCUSATE SODIUM AND SENNOSIDES SCH TAB: 8.6; 5 TABLET, FILM COATED ORAL at 10:29

## 2019-04-12 RX ADMIN — DOCUSATE SODIUM AND SENNOSIDES SCH TAB: 8.6; 5 TABLET, FILM COATED ORAL at 21:21

## 2019-04-12 RX ADMIN — OXYCODONE HYDROCHLORIDE AND ACETAMINOPHEN 1 TAB: 5; 325 TABLET ORAL at 23:43

## 2019-04-12 NOTE — QN
Documentation


Comment


No complaint


Afebrile VSS


Abdomen soft


ALT and AST improving


Continue with present care.











CORA ORO MD            Apr 12, 2019 19:21

## 2019-04-13 VITALS — SYSTOLIC BLOOD PRESSURE: 119 MMHG | DIASTOLIC BLOOD PRESSURE: 78 MMHG | RESPIRATION RATE: 18 BRPM | HEART RATE: 70 BPM

## 2019-04-13 VITALS — HEART RATE: 66 BPM | SYSTOLIC BLOOD PRESSURE: 139 MMHG | RESPIRATION RATE: 19 BRPM | DIASTOLIC BLOOD PRESSURE: 88 MMHG

## 2019-04-13 VITALS — RESPIRATION RATE: 20 BRPM | HEART RATE: 87 BPM | SYSTOLIC BLOOD PRESSURE: 137 MMHG | DIASTOLIC BLOOD PRESSURE: 88 MMHG

## 2019-04-13 VITALS — SYSTOLIC BLOOD PRESSURE: 130 MMHG | HEART RATE: 72 BPM | DIASTOLIC BLOOD PRESSURE: 82 MMHG | RESPIRATION RATE: 18 BRPM

## 2019-04-13 LAB
ADD MAN DIFF?: NO
ALANINE AMINOTRANSFERASE: 80 IU/L (ref 13–69)
ALBUMIN/GLOBULIN RATIO: 0.93
ALBUMIN: 2.9 G/DL (ref 3.3–4.9)
ALKALINE PHOSPHATASE: 108 IU/L (ref 42–121)
ANION GAP: 10 (ref 5–13)
ASPARTATE AMINO TRANSFERASE: 51 IU/L (ref 15–46)
BASOPHIL #: 0 10^3/UL (ref 0–0.1)
BASOPHILS %: 0.4 % (ref 0–2)
BILIRUBIN,DIRECT: 0 MG/DL (ref 0–0.2)
BILIRUBIN,TOTAL: 0.2 MG/DL (ref 0.2–1.3)
BLOOD UREA NITROGEN: 13 MG/DL (ref 7–20)
CALCIUM: 8.9 MG/DL (ref 8.4–10.2)
CARBON DIOXIDE: 23 MMOL/L (ref 21–31)
CHLORIDE: 105 MMOL/L (ref 97–110)
CREATININE: 0.53 MG/DL (ref 0.44–1)
EOSINOPHILS #: 0.1 10^3/UL (ref 0–0.5)
EOSINOPHILS %: 1.6 % (ref 0–7)
GLOBULIN: 3.1 G/DL (ref 1.3–3.2)
GLUCOSE: 80 MG/DL (ref 70–220)
HEMATOCRIT: 27.8 % (ref 37–47)
HEMOGLOBIN: 9.3 G/DL (ref 12–16)
IMMATURE GRANS #M: 0.05 10^3/UL (ref 0–0.03)
IMMATURE GRANS % (M): 0.6 % (ref 0–0.43)
LYMPHOCYTES #: 1.7 10^3/UL (ref 0.8–2.9)
LYMPHOCYTES %: 19.7 % (ref 15–51)
MEAN CORPUSCULAR HEMOGLOBIN: 32.2 PG (ref 29–33)
MEAN CORPUSCULAR HGB CONC: 33.5 G/DL (ref 32–37)
MEAN CORPUSCULAR VOLUME: 96.2 FL (ref 82–101)
MEAN PLATELET VOLUME: 11.6 FL (ref 7.4–10.4)
MONOCYTE #: 0.7 10^3/UL (ref 0.3–0.9)
MONOCYTES %: 8 % (ref 0–11)
NEUTROPHIL #: 6 10^3/UL (ref 1.6–7.5)
NEUTROPHILS %: 69.7 % (ref 39–77)
NUCLEATED RED BLOOD CELLS #: 0 10^3/UL (ref 0–0)
NUCLEATED RED BLOOD CELLS%: 0.2 /100WBC (ref 0–0)
PLATELET COUNT: 198 10^3/UL (ref 140–415)
POTASSIUM: 4.1 MMOL/L (ref 3.5–5.1)
RED BLOOD COUNT: 2.89 10^6/UL (ref 4.2–5.4)
RED CELL DISTRIBUTION WIDTH: 12.9 % (ref 11.5–14.5)
SODIUM: 138 MMOL/L (ref 135–144)
TOTAL PROTEIN: 6 G/DL (ref 6.1–8.1)
URIC ACID: 4.9 MG/DL (ref 3.1–7.9)
WHITE BLOOD COUNT: 8.5 10^3/UL (ref 4.8–10.8)

## 2019-04-13 RX ADMIN — OXYCODONE HYDROCHLORIDE AND ACETAMINOPHEN 1 TAB: 5; 325 TABLET ORAL at 15:23

## 2019-04-13 RX ADMIN — FERROUS SULFATE TAB 325 MG (65 MG ELEMENTAL FE) SCH MG: 325 (65 FE) TAB at 21:39

## 2019-04-13 RX ADMIN — OXYCODONE HYDROCHLORIDE AND ACETAMINOPHEN 1 TAB: 5; 325 TABLET ORAL at 11:46

## 2019-04-13 RX ADMIN — FERROUS SULFATE TAB 325 MG (65 MG ELEMENTAL FE) 1 MG: 325 (65 FE) TAB at 21:39

## 2019-04-13 RX ADMIN — CLOSTRIDIUM TETANI TOXOID ANTIGEN (FORMALDEHYDE INACTIVATED), CORYNEBACTERIUM DIPHTHERIAE TOXOID ANTIGEN (FORMALDEHYDE INACTIVATED), BORDETELLA PERTUSSIS TOXOID ANTIGEN (GLUTARALDEHYDE INACTIVATED), BORDETELLA PERTUSSIS FILAMENTOUS HEMAGGLUTININ ANTIGEN (FORMALDEHYDE INACTIVATED), BORDETELLA PERTUSSIS PERTACTIN ANTIGEN, AND BORDETELLA PERTUSSIS FIMBRIAE 2/3 ANTIGEN 1 ML: 5; 2; 2.5; 5; 3; 5 INJECTION, SUSPENSION INTRAMUSCULAR at 09:00

## 2019-04-13 RX ADMIN — FERROUS SULFATE TAB 325 MG (65 MG ELEMENTAL FE) 1 MG: 325 (65 FE) TAB at 09:00

## 2019-04-13 RX ADMIN — DOCUSATE SODIUM AND SENNOSIDES SCH TAB: 8.6; 5 TABLET, FILM COATED ORAL at 09:00

## 2019-04-13 RX ADMIN — DOCUSATE SODIUM AND SENNOSIDES 1 TAB: 8.6; 5 TABLET, FILM COATED ORAL at 21:39

## 2019-04-13 RX ADMIN — IBUPROFEN 1 MG: 800 TABLET, FILM COATED ORAL at 06:00

## 2019-04-13 RX ADMIN — DOCUSATE SODIUM AND SENNOSIDES SCH TAB: 8.6; 5 TABLET, FILM COATED ORAL at 21:39

## 2019-04-13 RX ADMIN — IBUPROFEN SCH MG: 800 TABLET ORAL at 21:39

## 2019-04-13 RX ADMIN — FERROUS SULFATE TAB 325 MG (65 MG ELEMENTAL FE) SCH MG: 325 (65 FE) TAB at 13:43

## 2019-04-13 RX ADMIN — IBUPROFEN 1 MG: 800 TABLET, FILM COATED ORAL at 13:43

## 2019-04-13 RX ADMIN — FERROUS SULFATE TAB 325 MG (65 MG ELEMENTAL FE) 1 MG: 325 (65 FE) TAB at 13:43

## 2019-04-13 RX ADMIN — IBUPROFEN SCH MG: 800 TABLET ORAL at 13:43

## 2019-04-13 RX ADMIN — DOCUSATE SODIUM AND SENNOSIDES 1 TAB: 8.6; 5 TABLET, FILM COATED ORAL at 09:00

## 2019-04-13 RX ADMIN — IBUPROFEN 1 MG: 800 TABLET, FILM COATED ORAL at 21:39

## 2019-04-13 RX ADMIN — FERROUS SULFATE TAB 325 MG (65 MG ELEMENTAL FE) SCH MG: 325 (65 FE) TAB at 09:00

## 2019-04-13 RX ADMIN — IBUPROFEN SCH MG: 800 TABLET ORAL at 06:00

## 2019-04-13 NOTE — QN
Documentation


Comment


No complaint


Afebrile VSS


Abdomen soft


Stable


Continue present care.











CORA ORO MD            Apr 13, 2019 15:56

## 2019-04-14 VITALS — SYSTOLIC BLOOD PRESSURE: 134 MMHG | HEART RATE: 77 BPM | DIASTOLIC BLOOD PRESSURE: 62 MMHG | RESPIRATION RATE: 19 BRPM

## 2019-04-14 VITALS — HEART RATE: 62 BPM | RESPIRATION RATE: 20 BRPM | SYSTOLIC BLOOD PRESSURE: 117 MMHG | DIASTOLIC BLOOD PRESSURE: 62 MMHG

## 2019-04-14 VITALS — SYSTOLIC BLOOD PRESSURE: 147 MMHG | HEART RATE: 81 BPM | RESPIRATION RATE: 20 BRPM | DIASTOLIC BLOOD PRESSURE: 80 MMHG

## 2019-04-14 VITALS — SYSTOLIC BLOOD PRESSURE: 142 MMHG | DIASTOLIC BLOOD PRESSURE: 77 MMHG | HEART RATE: 59 BPM | RESPIRATION RATE: 18 BRPM

## 2019-04-14 VITALS — HEART RATE: 67 BPM | SYSTOLIC BLOOD PRESSURE: 118 MMHG | DIASTOLIC BLOOD PRESSURE: 77 MMHG | RESPIRATION RATE: 18 BRPM

## 2019-04-14 LAB
ADD MAN DIFF?: NO
ALANINE AMINOTRANSFERASE: 57 IU/L (ref 13–69)
ALBUMIN/GLOBULIN RATIO: 1.03
ALBUMIN: 3.2 G/DL (ref 3.3–4.9)
ALKALINE PHOSPHATASE: 131 IU/L (ref 42–121)
ANION GAP: 9 (ref 5–13)
ASPARTATE AMINO TRANSFERASE: 27 IU/L (ref 15–46)
BASOPHIL #: 0 10^3/UL (ref 0–0.1)
BASOPHILS %: 0.4 % (ref 0–2)
BILIRUBIN,DIRECT: 0 MG/DL (ref 0–0.2)
BILIRUBIN,TOTAL: 0.3 MG/DL (ref 0.2–1.3)
BLOOD UREA NITROGEN: 15 MG/DL (ref 7–20)
CALCIUM: 9.1 MG/DL (ref 8.4–10.2)
CARBON DIOXIDE: 22 MMOL/L (ref 21–31)
CHLORIDE: 108 MMOL/L (ref 97–110)
CREATININE: 0.61 MG/DL (ref 0.44–1)
EOSINOPHILS #: 0.2 10^3/UL (ref 0–0.5)
EOSINOPHILS %: 2.8 % (ref 0–7)
GLOBULIN: 3.1 G/DL (ref 1.3–3.2)
GLUCOSE: 102 MG/DL (ref 70–220)
HEMATOCRIT: 28.8 % (ref 37–47)
HEMOGLOBIN: 10 G/DL (ref 12–16)
IMMATURE GRANS #M: 0.05 10^3/UL (ref 0–0.03)
IMMATURE GRANS % (M): 0.7 % (ref 0–0.43)
LYMPHOCYTES #: 1.6 10^3/UL (ref 0.8–2.9)
LYMPHOCYTES %: 21.4 % (ref 15–51)
MEAN CORPUSCULAR HEMOGLOBIN: 32.9 PG (ref 29–33)
MEAN CORPUSCULAR HGB CONC: 34.7 G/DL (ref 32–37)
MEAN CORPUSCULAR VOLUME: 94.7 FL (ref 82–101)
MEAN PLATELET VOLUME: 11.4 FL (ref 7.4–10.4)
MONOCYTE #: 0.6 10^3/UL (ref 0.3–0.9)
MONOCYTES %: 7.6 % (ref 0–11)
NEUTROPHIL #: 5.1 10^3/UL (ref 1.6–7.5)
NEUTROPHILS %: 67.1 % (ref 39–77)
NUCLEATED RED BLOOD CELLS #: 0 10^3/UL (ref 0–0)
NUCLEATED RED BLOOD CELLS%: 0 /100WBC (ref 0–0)
PLATELET COUNT: 223 10^3/UL (ref 140–415)
POTASSIUM: 3.9 MMOL/L (ref 3.5–5.1)
RED BLOOD COUNT: 3.04 10^6/UL (ref 4.2–5.4)
RED CELL DISTRIBUTION WIDTH: 12.9 % (ref 11.5–14.5)
SODIUM: 139 MMOL/L (ref 135–144)
TOTAL PROTEIN: 6.3 G/DL (ref 6.1–8.1)
URIC ACID: 4.5 MG/DL (ref 3.1–7.9)
WHITE BLOOD COUNT: 7.6 10^3/UL (ref 4.8–10.8)

## 2019-04-14 RX ADMIN — IBUPROFEN 1 MG: 800 TABLET, FILM COATED ORAL at 13:30

## 2019-04-14 RX ADMIN — FERROUS SULFATE TAB 325 MG (65 MG ELEMENTAL FE) SCH MG: 325 (65 FE) TAB at 08:50

## 2019-04-14 RX ADMIN — IBUPROFEN 1 MG: 800 TABLET, FILM COATED ORAL at 22:30

## 2019-04-14 RX ADMIN — FERROUS SULFATE TAB 325 MG (65 MG ELEMENTAL FE) 1 MG: 325 (65 FE) TAB at 08:50

## 2019-04-14 RX ADMIN — IBUPROFEN SCH MG: 800 TABLET ORAL at 13:30

## 2019-04-14 RX ADMIN — IBUPROFEN SCH MG: 800 TABLET ORAL at 22:30

## 2019-04-14 RX ADMIN — IBUPROFEN 1 MG: 800 TABLET, FILM COATED ORAL at 06:00

## 2019-04-14 RX ADMIN — DOCUSATE SODIUM AND SENNOSIDES 1 TAB: 8.6; 5 TABLET, FILM COATED ORAL at 21:03

## 2019-04-14 RX ADMIN — DOCUSATE SODIUM AND SENNOSIDES SCH TAB: 8.6; 5 TABLET, FILM COATED ORAL at 21:03

## 2019-04-14 RX ADMIN — OXYCODONE HYDROCHLORIDE AND ACETAMINOPHEN 1 TAB: 5; 325 TABLET ORAL at 08:50

## 2019-04-14 RX ADMIN — FERROUS SULFATE TAB 325 MG (65 MG ELEMENTAL FE) SCH MG: 325 (65 FE) TAB at 21:03

## 2019-04-14 RX ADMIN — DOCUSATE SODIUM AND SENNOSIDES SCH TAB: 8.6; 5 TABLET, FILM COATED ORAL at 08:50

## 2019-04-14 RX ADMIN — IBUPROFEN SCH MG: 800 TABLET ORAL at 06:00

## 2019-04-14 RX ADMIN — FERROUS SULFATE TAB 325 MG (65 MG ELEMENTAL FE) SCH MG: 325 (65 FE) TAB at 12:41

## 2019-04-14 RX ADMIN — DOCUSATE SODIUM AND SENNOSIDES 1 TAB: 8.6; 5 TABLET, FILM COATED ORAL at 08:50

## 2019-04-14 RX ADMIN — OXYCODONE HYDROCHLORIDE AND ACETAMINOPHEN 1 TAB: 5; 325 TABLET ORAL at 02:28

## 2019-04-14 RX ADMIN — FERROUS SULFATE TAB 325 MG (65 MG ELEMENTAL FE) 1 MG: 325 (65 FE) TAB at 21:03

## 2019-04-14 RX ADMIN — FERROUS SULFATE TAB 325 MG (65 MG ELEMENTAL FE) 1 MG: 325 (65 FE) TAB at 12:41

## 2019-04-14 NOTE — QN
Documentation


Comment


no complaint


Afebrile /80


Abdomen soft


Stable


Continue to monitor BP











CORA ORO MD            Apr 14, 2019 15:04

## 2019-04-15 VITALS — RESPIRATION RATE: 20 BRPM | SYSTOLIC BLOOD PRESSURE: 136 MMHG | DIASTOLIC BLOOD PRESSURE: 76 MMHG | HEART RATE: 56 BPM

## 2019-04-15 VITALS — SYSTOLIC BLOOD PRESSURE: 128 MMHG | RESPIRATION RATE: 18 BRPM | HEART RATE: 58 BPM | DIASTOLIC BLOOD PRESSURE: 82 MMHG

## 2019-04-15 RX ADMIN — DOCUSATE SODIUM AND SENNOSIDES 1 TAB: 8.6; 5 TABLET, FILM COATED ORAL at 09:00

## 2019-04-15 RX ADMIN — FERROUS SULFATE TAB 325 MG (65 MG ELEMENTAL FE) SCH MG: 325 (65 FE) TAB at 12:23

## 2019-04-15 RX ADMIN — DOCUSATE SODIUM AND SENNOSIDES SCH TAB: 8.6; 5 TABLET, FILM COATED ORAL at 09:00

## 2019-04-15 RX ADMIN — FERROUS SULFATE TAB 325 MG (65 MG ELEMENTAL FE) 1 MG: 325 (65 FE) TAB at 10:36

## 2019-04-15 RX ADMIN — IBUPROFEN SCH MG: 800 TABLET ORAL at 06:10

## 2019-04-15 RX ADMIN — FERROUS SULFATE TAB 325 MG (65 MG ELEMENTAL FE) 1 MG: 325 (65 FE) TAB at 12:23

## 2019-04-15 RX ADMIN — CLOSTRIDIUM TETANI TOXOID ANTIGEN (FORMALDEHYDE INACTIVATED), CORYNEBACTERIUM DIPHTHERIAE TOXOID ANTIGEN (FORMALDEHYDE INACTIVATED), BORDETELLA PERTUSSIS TOXOID ANTIGEN (GLUTARALDEHYDE INACTIVATED), BORDETELLA PERTUSSIS FILAMENTOUS HEMAGGLUTININ ANTIGEN (FORMALDEHYDE INACTIVATED), BORDETELLA PERTUSSIS PERTACTIN ANTIGEN, AND BORDETELLA PERTUSSIS FIMBRIAE 2/3 ANTIGEN 1 ML: 5; 2; 2.5; 5; 3; 5 INJECTION, SUSPENSION INTRAMUSCULAR at 07:50

## 2019-04-15 RX ADMIN — IBUPROFEN 1 MG: 800 TABLET, FILM COATED ORAL at 14:00

## 2019-04-15 RX ADMIN — IBUPROFEN 1 MG: 800 TABLET, FILM COATED ORAL at 06:10

## 2019-04-15 RX ADMIN — IBUPROFEN SCH MG: 800 TABLET ORAL at 14:00

## 2019-04-15 RX ADMIN — FERROUS SULFATE TAB 325 MG (65 MG ELEMENTAL FE) SCH MG: 325 (65 FE) TAB at 10:36

## 2019-04-15 NOTE — DS
Date/Time of Note


Date/Time of Note


DATE: 4/15/19 


TIME: 13:46





Obstetrical Discharge Record


Final Diagnosis


Final Diagnosis:   delivered





 Section


 Section:  Repeat





Complications


Preg induced Hypertension


Tocolytics:  Magnesium Sulfate





Condition on Discharge


Physical Assessment


Voiding:  Yes


Bowel Movement:  Yes


Breast:  Soft, non-tender, Filling


Fundus:  Firm


Abdomen and Incision:


Incision intact


Calf Tenderness:  No


Patient Condition:  Stable











CORA ORO MD            Apr 15, 2019 13:46

## 2019-04-16 NOTE — DELSUM
===================================

Delivery Summary A-C

===================================

Datetime Report Generated by CPN: 2019 21:19

   

   

===================================

DELIVERY PERSONNEL

===================================

   

Circulator:  Tamara Jose

   

===================================

MATERNAL INFORMATION

===================================

   

Delivery Anesthesia:  Spinal

Medications in Delivery:  see anesthesia record

Delivery QBL (ml):  600

Placenta Cultured:  No

Maternal Complications:  Other

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

RN Comments:  GBS UNKNOWN,B POS

      

   NEONATOLOGIST PRESENT FOR DELIVERY 

   

===================================

LABOR SUMMARY

===================================

   

EDC:  05/15/2019 00:00

No. Babies in Womb:  1

 Attempted:  No

Labor Anesthesia:  None

   

===================================

LABOR INFORMATION

===================================

   

Reason for Induction:  Not Applicable

Oxytocin:  N/A

Group B Beta Strep:  Not Done

Antibiotics # of Doses:  2

Antibiotics Time of Last Dose:  04/10/2019 17:45

Steroids Given:  Full Course; <24Hrs before Delivery

Reason Steroids Not Administered:  Other

   

===================================

MEMBRANES

===================================

   

Membranes Rupture Method:  Artificial

Rupture of Membranes:  04/10/2019 18:15

Length of Rupture (hr):  0.02

Amniotic Fluid Color:  Clear

Amniotic Fluid Amount:  Moderate

Amniotic Fluid Odor:  None

   

===================================

STAGES OF LABOR

===================================

   

Stage 3 hr:  0

Stage 3 min:  4

   

===================================

CSECTION DELIVERY

===================================

   

Primary Indication:  Other

Other Primary Indication:  PIH

Secondary Indication:  Other

Other Secondary Indication:  SUSPECTED IUGR, 1071GMS

CSection Urgency:  Non Elective

CSection Incidence:  Repeat

Labor:  No Labor

Elective:  N/A

CSection Incision:  Lower Uterine Transverse

   

===================================

BABY A INFORMATION

===================================

   

Infant Delivery Date/Time:  04/10/2019 18:16

Method of Delivery:  

Born in Route :  No

:  N/A

Forceps:  N/A

Vacuum Extraction:  N/A

Shoulder Dystocia :  N/A

   

===================================

SHOULDER DYSTOCIA BABY A

===================================

   

Infant Delivery Date/Time:  04/10/2019 18:16

   

===================================

PRESENTATION/POSITION BABY A

===================================

   

Presentation:  Other

Cephalic Presentation:  N/A

Breech Presentation:  Complete

   

===================================

PLACENTA INFORMATION BABY A

===================================

   

Placenta Delivery Time :  04/10/2019 18:20

Placenta Method of Delivery:  Manual Removal

Placenta Status:  Delivered

   

===================================

APGAR SCORES BABY A

===================================

   

Heart Rate 1 min:  >100 bpm

Resp Effort 1 min:  Good Cry

Reflex Irritability 1 min:  Cough/Sneeze/Pulls Away

Muscle Tone 1 min:  Some Flexion of Extrem

Color 1 min:  Body Pink, Extremit Blue

Resuscitation Effort 1 min:  Tactile Stimulation

APGAR SCORE 1 MIN:  8

Heart Rate 5 min:  >100 bpm

Resp Effort 5 min:  Good Cry

Reflex Irritability 5 min:  Cough/Sneeze/Pulls Away

Muscle Tone 5 min:  Active Motion

Color 5 min:  Body Pink, Extremit Blue

Resuscitation Effort 5 min:  Tactile Stimulation

APGAR SCORE 5 MIN:  9

   

===================================

INFANT INFORMATION BABY A

===================================

   

Gestational Age at Delivery:  35.0

Gestational Status:  Late - 34- 36.6 Weeks

Infant Outcome :  Liveborn

Infant Condition :  Fair

Infant Sex:  Female

   

===================================

IDENTIFICATION/MEDS BABY A

===================================

   

ID Band Number:  [id number]

ID Band Location:  Other



Sensor Applied:  No

Vitamin K Given :  Not Given

Erythromycin Given:  Not Given

   

===================================

WEIGHT/LENGTH BABY A

===================================

   

Infant Birthweight (gm):  2175

Infant Weight (lb):  4

Infant Weight (oz):  13

Infant Length (in):  18.00

Infant Length (cm):  45.72

   

===================================

CORD INFORMATION BABY A

===================================

   

No. Cord Vessels:  3

Nuchal Cord :  N/A

Cord Blood Taken:  Yes

Infant Suction:  Mouth; Nose

   

===================================

ASSESSMENT BABY A

===================================

   

Infant Complications:  None

Physical Findings at Delivery:  Within Normal Limits

Infant Respirations:  Appears Normal

Neonatologist/ALS Called :  Yes

Infant Care By:  NICU TEAM/LITZY RN

Transferred To:  NICU